# Patient Record
Sex: MALE | Race: WHITE | Employment: FULL TIME | URBAN - METROPOLITAN AREA
[De-identification: names, ages, dates, MRNs, and addresses within clinical notes are randomized per-mention and may not be internally consistent; named-entity substitution may affect disease eponyms.]

---

## 2024-05-11 ENCOUNTER — HOSPITAL ENCOUNTER (EMERGENCY)
Age: 24
Discharge: HOME | End: 2024-05-11
Attending: EMERGENCY MEDICINE

## 2024-05-11 VITALS
OXYGEN SATURATION: 100 % | TEMPERATURE: 98 F | BODY MASS INDEX: 35.78 KG/M2 | HEART RATE: 71 BPM | RESPIRATION RATE: 18 BRPM | HEIGHT: 73 IN | DIASTOLIC BLOOD PRESSURE: 75 MMHG | SYSTOLIC BLOOD PRESSURE: 152 MMHG | WEIGHT: 270 LBS

## 2024-05-11 DIAGNOSIS — K08.89 PAIN, DENTAL: Primary | ICD-10-CM

## 2024-05-11 PROCEDURE — 99283 EMERGENCY DEPT VISIT LOW MDM: CPT

## 2024-05-11 RX ORDER — HYDROCODONE BITARTRATE AND ACETAMINOPHEN 5; 325 MG/1; MG/1
2 TABLET ORAL ONCE
Status: COMPLETED | OUTPATIENT
Start: 2024-05-11 | End: 2024-05-11

## 2024-05-11 RX ORDER — CLINDAMYCIN HYDROCHLORIDE 300 MG/1
300 CAPSULE ORAL 3 TIMES DAILY
Qty: 21 CAPSULE | Refills: 0 | Status: SHIPPED | OUTPATIENT
Start: 2024-05-11 | End: 2024-05-18

## 2024-05-11 RX ORDER — HYDROCODONE BITARTRATE AND ACETAMINOPHEN 5; 325 MG/1; MG/1
1-2 TABLET ORAL EVERY 6 HOURS PRN
Qty: 20 TABLET | Refills: 0 | Status: SHIPPED | OUTPATIENT
Start: 2024-05-11 | End: 2024-05-11

## 2024-05-11 RX ORDER — CLINDAMYCIN HYDROCHLORIDE 300 MG/1
300 CAPSULE ORAL 3 TIMES DAILY
Qty: 21 CAPSULE | Refills: 0 | Status: SHIPPED | OUTPATIENT
Start: 2024-05-11 | End: 2024-05-11

## 2024-05-11 RX ORDER — HYDROCODONE BITARTRATE AND ACETAMINOPHEN 5; 325 MG/1; MG/1
1-2 TABLET ORAL EVERY 6 HOURS PRN
Qty: 20 TABLET | Refills: 0 | Status: SHIPPED | OUTPATIENT
Start: 2024-05-11 | End: 2024-05-16

## 2024-05-11 NOTE — ED INITIAL ASSESSMENT (HPI)
Pt has pain on upper and lower wisdom teeth on the left side. Has been gong on for a few weeks but the pain has been excruciating as of recently. Pt has to keep drinking water to keep the pain at bay. Pt took 1000mg of ibuprofen at 2100 tonight.

## 2024-05-11 NOTE — ED PROVIDER NOTES
Patient Seen in: [facility] [place]      History     Chief Complaint   Patient presents with    Dental Problem     Stated Complaint: dental pain    Subjective:   HPI    Patient is a 23-year-old male presents emergency room for evaluation of wisdom tooth pain.  Patient states he has had pain to bilateral wisdom teeth left upper and lower for the last 2 days.  He states he is supposed to get his wisdom teeth out that the end of the month.  He states now he could only tolerate the pain if he is sucking on something cold.  Denies fever or trouble swallowing.    Objective:   History reviewed. No pertinent past medical history.           History reviewed. No pertinent surgical history.             Social History     Socioeconomic History    Marital status: Single   Tobacco Use    Smoking status: Never    Smokeless tobacco: Never   Vaping Use    Vaping status: Every Day   Substance and Sexual Activity    Alcohol use: Yes     Comment: occasionally    Drug use: Never              Review of Systems    Positive for stated complaint: dental pain  Other systems are as noted in HPI.  Constitutional and vital signs reviewed.      All other systems reviewed and negative except as noted above.    Physical Exam     ED Triage Vitals [05/11/24 0206]   /75   Pulse 71   Resp 18   Temp 98 °F (36.7 °C)   Temp src Temporal   SpO2 100 %   O2 Device None (Room air)       Current Vitals:   Vital Signs  BP: 152/75  Pulse: 71  Resp: 18  Temp: 98 °F (36.7 °C)  Temp src: Temporal    Oxygen Therapy  SpO2: 100 %  O2 Device: None (Room air)            Physical Exam    Constitutional: Well-appearing in no acute distress  HEENT: Patient has tenderness to percussion mildly left upper wisdom tooth and left lower wisdom tooth.  No evidence for intraoral abscess.  Posterior pharynx unremarkable.  No facial swelling.    ED Course   Labs Reviewed - No data to display                   MDM      Patient is a 23-year-old male  presents to emergency room for evaluation of dental pain.  Differential dental pain, dental carry, dental abscess.  No evidence for dental abscess.  Patient with pain at the wisdom teeth.  Recommend wisdom tooth extraction.  Patient will cover with antibiotics, clindamycin and Norco for pain.  Recommend following up with oral surgery.    Patient was screened and evaluated during this visit.   As a treating physician attending to the patient, I determined, within reasonable clinical confidence and prior to discharge, that an emergency medical condition was not or was no longer present.  There was no indication for further evaluation, treatment or admission on an emergency basis.  Comprehensive verbal and written discharge and follow-up instructions were provided to help prevent relapse or worsening.  Patient was instructed to follow-up with her primary care provider for further evaluation and treatment, but to return immediately to the ER for worsening, concerning, new, changing or persisting symptoms.  I discussed the case with the patient and they had no questions, complaints, or concerns.  Patient felt comfortable going home.                                       MDM    Disposition and Plan     Clinical Impression:  1. Pain, dental         Disposition:  Discharge  5/11/2024  2:38 am    Follow-up:  Asif King DDS  [address] [facility] [address]  [phone]    Follow up  As needed          Medications Prescribed:  Discharge Medication List as of 5/11/2024  3:04 AM